# Patient Record
Sex: FEMALE | Race: BLACK OR AFRICAN AMERICAN | NOT HISPANIC OR LATINO | Employment: OTHER | URBAN - METROPOLITAN AREA
[De-identification: names, ages, dates, MRNs, and addresses within clinical notes are randomized per-mention and may not be internally consistent; named-entity substitution may affect disease eponyms.]

---

## 2018-08-02 ENCOUNTER — HOSPITAL ENCOUNTER (EMERGENCY)
Facility: OTHER | Age: 28
Discharge: HOME | End: 2018-08-02
Attending: EMERGENCY MEDICINE
Payer: MEDICAID

## 2018-08-02 VITALS
HEIGHT: 65 IN | OXYGEN SATURATION: 100 % | TEMPERATURE: 99 F | HEART RATE: 88 BPM | BODY MASS INDEX: 24.16 KG/M2 | RESPIRATION RATE: 20 BRPM | WEIGHT: 145 LBS

## 2018-08-02 DIAGNOSIS — Z71.1 FEARED COMPLAINT WITHOUT DIAGNOSIS: Primary | ICD-10-CM

## 2018-08-02 LAB
B-HCG UR QL: NEGATIVE
BACTERIA #/AREA URNS HPF: ABNORMAL /HPF
BILIRUB UR QL STRIP: NEGATIVE
CLARITY UR: CLEAR
COLOR UR: YELLOW
CTP QC/QA: YES
GLUCOSE UR QL STRIP: NEGATIVE
HGB UR QL STRIP: ABNORMAL
KETONES UR QL STRIP: NEGATIVE
LEUKOCYTE ESTERASE UR QL STRIP: NEGATIVE
MICROSCOPIC COMMENT: ABNORMAL
NITRITE UR QL STRIP: NEGATIVE
PH UR STRIP: 6 [PH] (ref 5–8)
PROT UR QL STRIP: NEGATIVE
RBC #/AREA URNS HPF: 4 /HPF (ref 0–4)
SP GR UR STRIP: 1.02 (ref 1–1.03)
SQUAMOUS #/AREA URNS HPF: 8 /HPF
URN SPEC COLLECT METH UR: ABNORMAL
UROBILINOGEN UR STRIP-ACNC: NEGATIVE EU/DL
WBC #/AREA URNS HPF: 2 /HPF (ref 0–5)

## 2018-08-02 PROCEDURE — 81000 URINALYSIS NONAUTO W/SCOPE: CPT

## 2018-08-02 PROCEDURE — 99283 EMERGENCY DEPT VISIT LOW MDM: CPT

## 2018-08-02 PROCEDURE — 87086 URINE CULTURE/COLONY COUNT: CPT

## 2018-08-02 PROCEDURE — 81025 URINE PREGNANCY TEST: CPT | Performed by: PHYSICIAN ASSISTANT

## 2018-08-02 RX ORDER — DIPHENHYDRAMINE HCL 25 MG
25 CAPSULE ORAL
Status: DISCONTINUED | OUTPATIENT
Start: 2018-08-02 | End: 2018-08-02

## 2018-08-02 NOTE — ED PROVIDER NOTES
"Encounter Date: 8/2/2018       History     Chief Complaint   Patient presents with    Polyuria     w/ pruritis x2 days     Patient is a 28-year-old female with autism who presents to the ED with her mother for evaluation of pruritus and polyuria.  Patient's mother is also a patient here in the ED with this same complaints. Patient's mother states she has noticed that her daughter has been going to the restroom more frequently.  She states otherwise, she is at her baseline.  She is eating and drinking normally.  She denies fever or chills.  She denies emesis.  Mother states she attempted to bring the patient to [facility] but they refused to evaluate her.  She states she would like her to get back on medicine for her behavior.  She denies that she inflict self-harm on her others but states she has good and bad days" where she is more hyperactive.  She states she was told to come the hospital if she wanted to receive blood work.      The history is provided by the patient.     Review of patient's allergies indicates:  No Known Allergies  Past Medical History:   Diagnosis Date    Autism      History reviewed. No pertinent surgical history.  History reviewed. No pertinent family history.  Social History   Substance Use Topics    Smoking status: Never Smoker    Smokeless tobacco: Not on file    Alcohol use No     Review of Systems   Unable to perform ROS: Other   Constitutional: Negative for activity change, appetite change and fever.   Gastrointestinal: Negative for vomiting.   Genitourinary: Positive for frequency.   Skin:        pruritis       Physical Exam     Initial Vitals [08/02/18 1046]   BP Pulse Resp Temp SpO2   -- 88 20 98.6 °F (37 °C) 100 %      MAP       --         Physical Exam    Constitutional: Vital signs are normal. She is cooperative. No distress.   HENT:   Head: Normocephalic and atraumatic.   Mouth/Throat: Oropharynx is clear and moist.   Left ear reveals normal TM without erythema, " effusion, or bulging.  Right TM is impacted by cerumen.   Eyes: EOM are normal. Pupils are equal, round, and reactive to light.   Neck: Normal range of motion. Neck supple.   Cardiovascular: Normal rate, regular rhythm and intact distal pulses.   Pulmonary/Chest: Breath sounds normal. She has no wheezes. She has no rhonchi. She has no rales.   Abdominal: Soft. Bowel sounds are normal. There is no tenderness.   Musculoskeletal: Normal range of motion. She exhibits no edema.   Neurological: She is alert and oriented to person, place, and time. No cranial nerve deficit. GCS eye subscore is 4. GCS verbal subscore is 5. GCS motor subscore is 6.   Skin: Skin is warm and dry. Capillary refill takes less than 2 seconds. No rash noted.   Psychiatric:   Behavior is at baseline          ED Course   Procedures  Labs Reviewed   URINALYSIS - Abnormal; Notable for the following:        Result Value    Occult Blood UA 1+ (*)     All other components within normal limits   URINALYSIS MICROSCOPIC - Abnormal; Notable for the following:     Bacteria, UA Few (*)     All other components within normal limits   CULTURE, URINE   POCT URINE PREGNANCY          Imaging Results    None          Medical Decision Making:   Initial Assessment:   Urgent evaluation of a 28 y.o. Female presenting to the emergency department complaining of polyruia. Patient is afebrile, nontoxic appearing and hemodynamically stable.  Patient appears well on exam.  She is autistic and minimally verbal at baseline.  No acute findings on exam.  I do not see any evidence of bites or scabies.  Patient is not scratching.  ED Management:  I see no reason to obtain blood work at this time.  Patient appears healthy.  Her vital signs are normal.  She has no signs of infection.  Her urinalysis reveals 4 RBCs, 2 WBCs and few bacteria.  Not fitting him enough to treat for UTI.  Will send for culture.  Mother was given multiple community resources to follow up with she is advised  to contact [facility] for says they have behavioral health unit there.  Mother was here with similar complaints of pruritus, I have sent her home with hydrocortisone cream and told her she may apply this to any lesions the patient is scratching.  No further workup is indicated at this time.  This note was created using [employer]. There may be typographical errors secondary to dictation.                       Clinical Impression:     1. Feared complaint without diagnosis                               Brian Mills PA-C  08/02/18 5743

## 2018-08-02 NOTE — ED TRIAGE NOTES
"Pt presents with with mother at bedside, pt is autistic and unable to speak. Mother reports urinary frequency x2 days. Mother denies blood in urine or odor. Mother denies that pt has suggested any abdominal pain. Pt is alert, clapping hands, taps self on head, and jumping up and down. Difficulty examining pt, she appears anxious and refuses to let staff examine her. Per mother she wants lab work done on pt so "they know what meds to put her on." Mother unspecific on who would be putting the pt on meds and what for. Pt states pt is itchy, no skin irritation seen. Skin warm, dry , and intact. Pt appears NAD.  "

## 2018-08-03 LAB
BACTERIA UR CULT: NORMAL
BACTERIA UR CULT: NORMAL

## 2019-12-08 ENCOUNTER — HOSPITAL ENCOUNTER (EMERGENCY)
Facility: OTHER | Age: 29
Discharge: HOME | End: 2019-12-08
Attending: EMERGENCY MEDICINE
Payer: MEDICAID

## 2019-12-08 VITALS
HEART RATE: 100 BPM | TEMPERATURE: 99 F | OXYGEN SATURATION: 100 % | DIASTOLIC BLOOD PRESSURE: 85 MMHG | RESPIRATION RATE: 18 BRPM | SYSTOLIC BLOOD PRESSURE: 115 MMHG

## 2019-12-08 DIAGNOSIS — R35.0 URINARY FREQUENCY: Primary | ICD-10-CM

## 2019-12-08 DIAGNOSIS — H61.21 IMPACTED CERUMEN OF RIGHT EAR: ICD-10-CM

## 2019-12-08 LAB
B-HCG UR QL: NEGATIVE
BACTERIA #/AREA URNS HPF: ABNORMAL /HPF
BILIRUB UR QL STRIP: NEGATIVE
CLARITY UR: ABNORMAL
COLOR UR: YELLOW
CTP QC/QA: YES
GLUCOSE UR QL STRIP: NEGATIVE
HGB UR QL STRIP: ABNORMAL
KETONES UR QL STRIP: NEGATIVE
LEUKOCYTE ESTERASE UR QL STRIP: ABNORMAL
MICROSCOPIC COMMENT: ABNORMAL
NITRITE UR QL STRIP: NEGATIVE
PH UR STRIP: 6 [PH] (ref 5–8)
PROT UR QL STRIP: NEGATIVE
RBC #/AREA URNS HPF: 6 /HPF (ref 0–4)
SP GR UR STRIP: 1.02 (ref 1–1.03)
SQUAMOUS #/AREA URNS HPF: 14 /HPF
URN SPEC COLLECT METH UR: ABNORMAL
UROBILINOGEN UR STRIP-ACNC: NEGATIVE EU/DL
WBC #/AREA URNS HPF: 15 /HPF (ref 0–5)

## 2019-12-08 PROCEDURE — 81025 URINE PREGNANCY TEST: CPT | Performed by: EMERGENCY MEDICINE

## 2019-12-08 PROCEDURE — 87086 URINE CULTURE/COLONY COUNT: CPT

## 2019-12-08 PROCEDURE — 99283 EMERGENCY DEPT VISIT LOW MDM: CPT

## 2019-12-08 PROCEDURE — 25000003 PHARM REV CODE 250: Performed by: PHYSICIAN ASSISTANT

## 2019-12-08 PROCEDURE — 81000 URINALYSIS NONAUTO W/SCOPE: CPT

## 2019-12-08 RX ORDER — SULFAMETHOXAZOLE AND TRIMETHOPRIM 800; 160 MG/1; MG/1
1 TABLET ORAL 2 TIMES DAILY
Qty: 6 TABLET | Refills: 0 | Status: SHIPPED | OUTPATIENT
Start: 2019-12-08 | End: 2019-12-11

## 2019-12-08 RX ADMIN — CARBAMIDE PEROXIDE 6.5% 5 DROP: 6.5 LIQUID AURICULAR (OTIC) at 02:12

## 2019-12-08 NOTE — ED PROVIDER NOTES
Encounter Date: 12/8/2019       History     Chief Complaint   Patient presents with    Cough     non-productive, no fever, +runny nose    Abdominal Pain     LLQ, denies any n/v/d, +polyuria     Patient is a 29-year-old female with history of mental retardation who presents to the emergency department with mother who is reporting she has a cough.  She states patient has been coughing and having runny nose over the last 3 days.  She states she is pulling at her right ear.  She states she has urinary frequency and complaining that it hurts when she pees.  Denies vomiting or diarrhea.  Denies fevers.  States she is still eating and drinking normally.  States she just ate a large meal of fried chicken.    The history is provided by the patient.     Review of patient's allergies indicates:  No Known Allergies  Past Medical History:   Diagnosis Date    Autism      History reviewed. No pertinent surgical history.  History reviewed. No pertinent family history.  Social History     Tobacco Use    Smoking status: Never Smoker   Substance Use Topics    Alcohol use: No    Drug use: No     Review of Systems   Constitutional: Negative for activity change, appetite change, chills, fatigue and fever.   HENT: Positive for ear pain and rhinorrhea. Negative for congestion, ear discharge, postnasal drip, sore throat and trouble swallowing.    Respiratory: Positive for cough. Negative for shortness of breath.    Cardiovascular: Negative for chest pain.   Gastrointestinal: Negative for abdominal pain, blood in stool, constipation, diarrhea, nausea and vomiting.   Genitourinary: Positive for dysuria and frequency. Negative for flank pain and hematuria.   Musculoskeletal: Negative for back pain, neck pain and neck stiffness.   Skin: Negative for rash and wound.   Neurological: Negative for dizziness, weakness, light-headedness and headaches.       Physical Exam     Initial Vitals [12/08/19 1306]   BP Pulse Resp Temp SpO2   115/85 100  18 98.6 °F (37 °C) 100 %      MAP       --         Physical Exam    Nursing note and vitals reviewed.  Constitutional: She appears well-developed and well-nourished. She is not diaphoretic.  Non-toxic appearance. No distress.   HENT:   Head: Normocephalic.   Right Ear: Hearing, external ear and ear canal normal.   Left Ear: Hearing, tympanic membrane, external ear and ear canal normal.   Nose: Nose normal.   Mouth/Throat: Oropharynx is clear and moist. No oropharyngeal exudate.   Right TM is not visualized due to impacted cerumen   Eyes: Conjunctivae are normal. Pupils are equal, round, and reactive to light.   Neck: Normal range of motion.   Cardiovascular: Normal rate and regular rhythm.   Pulmonary/Chest: Breath sounds normal.   Abdominal: Soft. Bowel sounds are normal. There is no tenderness. There is no rigidity, no rebound, no guarding, no CVA tenderness, no tenderness at McBurney's point and negative Pan's sign.   Lymphadenopathy:     She has no cervical adenopathy.   Neurological: She is alert.   Nonverbal at baseline   Skin: Skin is warm and dry. Capillary refill takes less than 2 seconds.   Psychiatric: She has a normal mood and affect.         ED Course   Procedures  Labs Reviewed   URINALYSIS, REFLEX TO URINE CULTURE - Abnormal; Notable for the following components:       Result Value    Appearance, UA Hazy (*)     Occult Blood UA 1+ (*)     Leukocytes, UA 1+ (*)     All other components within normal limits    Narrative:     Preferred Collection Type->Urine, Clean Catch   URINALYSIS MICROSCOPIC - Abnormal; Notable for the following components:    RBC, UA 6 (*)     WBC, UA 15 (*)     Bacteria Many (*)     All other components within normal limits    Narrative:     Preferred Collection Type->Urine, Clean Catch   CULTURE, URINE   POCT URINE PREGNANCY          Imaging Results    None          Medical Decision Making:   Initial Assessment:   Urgent evaluation of a 29-year-old female with history of mental  retardation who presents to the emergency department with mother.  Patient is afebrile, nontoxic appearing, and hemodynamically stable. Patient is nonverbal.  She appears happy and is eating fried chicken on my exam.  Benign abdominal exam.  Right tympanic membrane is not visualized due to impacted cerumen.  Will give debrox.  U/A is a dirty catch but with patient's symptoms will give abx.  Mother is advised to follow up with PCP.  Mother is advised to return to the emergency department with any worsening symptoms or concerns.                                 Clinical Impression:       ICD-10-CM ICD-9-CM   1. Urinary frequency R35.0 788.41   2. Impacted cerumen of right ear H61.21 380.4                             Sarah Posey PA-C  12/08/19 1723

## 2019-12-08 NOTE — ED TRIAGE NOTES
"Pt reports child has been c/o pain to the right ear and pointing to her stomach for the past two days. She denies any N/V/D. Per mom " Pt ate a box of chicken today."  "

## 2019-12-10 LAB
BACTERIA UR CULT: NORMAL
BACTERIA UR CULT: NORMAL

## 2020-09-08 ENCOUNTER — HOSPITAL ENCOUNTER (EMERGENCY)
Facility: OTHER | Age: 30
Discharge: HOME | End: 2020-09-08
Attending: EMERGENCY MEDICINE
Payer: MEDICAID

## 2020-09-08 VITALS
SYSTOLIC BLOOD PRESSURE: 119 MMHG | HEART RATE: 80 BPM | OXYGEN SATURATION: 100 % | WEIGHT: 145 LBS | DIASTOLIC BLOOD PRESSURE: 57 MMHG | RESPIRATION RATE: 18 BRPM | TEMPERATURE: 98 F | BODY MASS INDEX: 24.13 KG/M2

## 2020-09-08 DIAGNOSIS — R45.1 AGITATION: ICD-10-CM

## 2020-09-08 DIAGNOSIS — B88.8 INFESTATION BY BED BUG: ICD-10-CM

## 2020-09-08 DIAGNOSIS — F95.8 BEHAVIORAL TIC: Primary | ICD-10-CM

## 2020-09-08 LAB
BACTERIA #/AREA URNS HPF: ABNORMAL /HPF
BILIRUB UR QL STRIP: NEGATIVE
CLARITY UR: CLEAR
COLOR UR: YELLOW
GLUCOSE UR QL STRIP: NEGATIVE
HGB UR QL STRIP: ABNORMAL
KETONES UR QL STRIP: NEGATIVE
LEUKOCYTE ESTERASE UR QL STRIP: ABNORMAL
MICROSCOPIC COMMENT: ABNORMAL
NITRITE UR QL STRIP: NEGATIVE
PH UR STRIP: 6 [PH] (ref 5–8)
PROT UR QL STRIP: NEGATIVE
RBC #/AREA URNS HPF: 1 /HPF (ref 0–4)
SP GR UR STRIP: 1.02 (ref 1–1.03)
SQUAMOUS #/AREA URNS HPF: 3 /HPF
URN SPEC COLLECT METH UR: ABNORMAL
UROBILINOGEN UR STRIP-ACNC: NEGATIVE EU/DL
WBC #/AREA URNS HPF: 4 /HPF (ref 0–5)
WBC CLUMPS URNS QL MICRO: ABNORMAL

## 2020-09-08 PROCEDURE — 81000 URINALYSIS NONAUTO W/SCOPE: CPT

## 2020-09-08 PROCEDURE — 99284 EMERGENCY DEPT VISIT MOD MDM: CPT

## 2020-09-08 RX ORDER — HYDROCORTISONE 1 %
CREAM (GRAM) TOPICAL
Qty: 30 G | Refills: 0 | Status: SHIPPED | OUTPATIENT
Start: 2020-09-08

## 2020-09-08 RX ORDER — RISPERIDONE 1 MG/1
1 TABLET ORAL DAILY
Qty: 15 TABLET | Refills: 0 | Status: SHIPPED | OUTPATIENT
Start: 2020-09-08 | End: 2020-09-23

## 2020-09-08 NOTE — ED NOTES
RN IN ROOM TO CHECK ON PT, VS OBTAINED. NEEDS ASSESSED. PT LAYING ON STRETCHER AT THIS TIME, DENIES PAIN OR SOB AT THIS TIME. WILL CONTINUE TO MONITOR.

## 2020-09-08 NOTE — ED NOTES
PT ARRIVED TO ROOM #7 WITH MOTHER AT BEDSIDE. MOM STATES PT HAS NOT BEEN TAKING HER BEHAVIORAL MEDICATIONS BECAUSE SHE HASN'T BROUGHT HER TO THE  PROVIDER. MOM ALSO STATES THAT PT HAS BEEN SCRATCHING ALL OVER AND SEEN LITTLE BLACK BUGS CRAWLING IN HER.   PROVIDER IN ROOM AT THIS TIME WITH RN.

## 2023-05-16 NOTE — ED PROVIDER NOTES
Encounter Date: 9/8/2020       History     Chief Complaint   Patient presents with    Agitation     Mother reports pt w/ agressive behavior for the last 2 weeks, also reports scratching herself     This is the urgent evaluation a 30-year-old black female with a past medical history of autism who is minimally verbal, mom reports that she has been hyperactive with increasing frequency is requesting some medications to help calm her down.  She reports increasing urinary frequency, the patient does not to be in any distress and mom states that she does not have any pain when urinating.  She also reports pruritus as she states there are black bugs noted in the home.  Mom reports a follow-up visit on the 14th with mental health clinic to get her medications for her behavior refilled when asked why she has not been taking the mom reports she has not been able to follow up with clinic in quite some time         Review of patient's allergies indicates:  No Known Allergies  Past Medical History:   Diagnosis Date    Autism      No past surgical history on file.  No family history on file.  Social History     Tobacco Use    Smoking status: Never Smoker   Substance Use Topics    Alcohol use: No    Drug use: No     Review of Systems   Constitutional: Negative for chills and fever.   Respiratory: Negative for cough and shortness of breath.    Cardiovascular: Negative for chest pain.   Gastrointestinal: Negative for abdominal pain.   Genitourinary: Positive for frequency.   Skin:        Itching   Psychiatric/Behavioral: The patient is hyperactive.    All other systems reviewed and are negative.      Physical Exam     Initial Vitals [09/08/20 1022]   BP Pulse Resp Temp SpO2   (!) 119/57 80 18 98.1 °F (36.7 °C) 100 %      MAP       --         Physical Exam    Nursing note and vitals reviewed.  Constitutional: Vital signs are normal. She appears well-developed and well-nourished. She is cooperative. She appears ill. No  Refill approved as requested.   distress.   Well-appearing 30-year-old female with excited outbursts of yelling.   HENT:   Head: Normocephalic and atraumatic.   Eyes: Conjunctivae are normal. Pupils are equal, round, and reactive to light.   Neck: Neck supple.   Cardiovascular: Normal rate, regular rhythm, S1 normal, S2 normal and normal heart sounds. Exam reveals no gallop.    No murmur heard.  Pulmonary/Chest: Effort normal and breath sounds normal. No tachypnea. She has no decreased breath sounds. She has no wheezes.   Neurological: She is alert and oriented to person, place, and time. GCS eye subscore is 4. GCS verbal subscore is 5. GCS motor subscore is 6.   Mom reports patient is at baseline mental status but with increasing frequency of hyperactive outbursts   Skin: Skin is warm, dry and intact. Capillary refill takes less than 2 seconds.   This creation marks noted to bilateral forearms or mom reports patient has been scratching   Psychiatric: She has a normal mood and affect. Her behavior is normal.         ED Course   Procedures  Labs Reviewed   URINALYSIS, REFLEX TO URINE CULTURE - Abnormal; Notable for the following components:       Result Value    Occult Blood UA 1+ (*)     Leukocytes, UA Trace (*)     All other components within normal limits    Narrative:     Specimen Source->Urine   URINALYSIS MICROSCOPIC - Abnormal; Notable for the following components:    Bacteria Moderate (*)     All other components within normal limits    Narrative:     Specimen Source->Urine          Imaging Results    None          Medical Decision Making:   Differential Diagnosis:   Autism disorder, behavioral agitation, UTI  Clinical Tests:   Lab Tests: Ordered and Reviewed  ED Management:  30-year-old female who is mildly verbal with autism presented with increasing agitation over the past few days, mom reports she has not been on her medications over the past few weeks but she does [facility].  I will restart the  patient's Risperdal 1 mg daily and make sure she follows up with mental health clinic.  Also discussed sprain house for bed bugs and clean all linen in hot water.                                 Clinical Impression:       ICD-10-CM ICD-9-CM   1. Behavioral tic  F95.8 307.20   2. Agitation  R45.1 307.9   3. Infestation by bed bug  B88.8 134.8         Disposition:   Disposition: Discharged  Condition: Stable                        Savanna Leach, [facility]  09/08/20 1231